# Patient Record
Sex: FEMALE | Race: WHITE | NOT HISPANIC OR LATINO | Employment: UNEMPLOYED | URBAN - METROPOLITAN AREA
[De-identification: names, ages, dates, MRNs, and addresses within clinical notes are randomized per-mention and may not be internally consistent; named-entity substitution may affect disease eponyms.]

---

## 2019-02-20 PROBLEM — N39.3 FEMALE STRESS INCONTINENCE: Status: ACTIVE | Noted: 2019-02-20

## 2020-05-19 ENCOUNTER — CLINICAL SUPPORT (OUTPATIENT)
Dept: URGENT CARE | Facility: CLINIC | Age: 35
End: 2020-05-19
Payer: MEDICAID

## 2020-05-19 VITALS
BODY MASS INDEX: 30.96 KG/M2 | HEIGHT: 66 IN | DIASTOLIC BLOOD PRESSURE: 66 MMHG | WEIGHT: 192.63 LBS | OXYGEN SATURATION: 97 % | HEART RATE: 77 BPM | TEMPERATURE: 98 F | SYSTOLIC BLOOD PRESSURE: 104 MMHG

## 2020-05-19 DIAGNOSIS — S29.012A STRAIN OF THORACIC BACK REGION: Primary | ICD-10-CM

## 2020-05-19 DIAGNOSIS — R07.9 LEFT-SIDED CHEST PAIN: ICD-10-CM

## 2020-05-19 PROCEDURE — 71100 X-RAY EXAM RIBS UNI 2 VIEWS: CPT | Mod: S$GLB,,, | Performed by: EMERGENCY MEDICINE

## 2020-05-19 PROCEDURE — 71100 PR X-RAY RIBS 2 VW UNILAT: ICD-10-PCS | Mod: S$GLB,,, | Performed by: EMERGENCY MEDICINE

## 2020-05-19 PROCEDURE — 99204 OFFICE O/P NEW MOD 45 MIN: CPT | Mod: 25,S$GLB,, | Performed by: NURSE PRACTITIONER

## 2020-05-19 PROCEDURE — 99204 PR OFFICE/OUTPT VISIT, NEW, LEVL IV, 45-59 MIN: ICD-10-PCS | Mod: 25,S$GLB,, | Performed by: NURSE PRACTITIONER

## 2020-05-19 RX ORDER — METHOCARBAMOL 500 MG/1
500 TABLET, FILM COATED ORAL 3 TIMES DAILY
Qty: 30 TABLET | Refills: 0 | Status: SHIPPED | OUTPATIENT
Start: 2020-05-19 | End: 2020-05-29

## 2020-05-19 RX ORDER — DEXAMETHASONE SODIUM PHOSPHATE 4 MG/ML
8 INJECTION, SOLUTION INTRA-ARTICULAR; INTRALESIONAL; INTRAMUSCULAR; INTRAVENOUS; SOFT TISSUE
Status: COMPLETED | OUTPATIENT
Start: 2020-05-19 | End: 2020-05-19

## 2020-05-19 RX ORDER — PREDNISONE 20 MG/1
20 TABLET ORAL 2 TIMES DAILY
Qty: 10 TABLET | Refills: 0 | Status: SHIPPED | OUTPATIENT
Start: 2020-05-19 | End: 2020-05-24

## 2020-05-19 RX ADMIN — DEXAMETHASONE SODIUM PHOSPHATE 8 MG: 4 INJECTION, SOLUTION INTRA-ARTICULAR; INTRALESIONAL; INTRAMUSCULAR; INTRAVENOUS; SOFT TISSUE at 04:05

## 2020-05-19 NOTE — PATIENT INSTRUCTIONS
Noncardiac Chest Pain    Based on your visit today, the healthcare provider doesnt know what is causing your chest pain. In most cases, people who come to the emergency department with chest pain dont have a problem with their heart. Instead, the pain is caused by other conditions. It's important for the healthcare team to be sure you are not having a life threatening cause for chest pain such as a heart attack, blood clot in the lungs, collapsed lung, ruptured esophagus, or tearing of the aorta. Once these major causes have been ruled out, you may have further evaluation for non-heart causes of chest pain. These may be problems with the lungs, muscles, bones, digestive tract, nerves, or mental health.  Lung problems  · Inflammation around the lungs (pleurisy)  · Collapsed lung (pneumothorax)  · Fluid around the lungs (pleural effusion)  · Lung cancer (a rare cause of chest pain)  Muscle or bone problems  · Inflamed cartilage between the ribs (costochondritis)  · Fibromyalgia  · Rheumatoid arthritis  · Chest wall strain  Digestive system problems  · Reflux  · Stomach ulcer  · Spasms of the esophagus  · Gall stones  · Gallbladder inflammation  Mental health conditions  · Panic or anxiety attacks  · Emotional distress  Your condition doesnt seem serious and your pain doesnt appear to be coming from your heart. But sometimes the signs of a serious problem take more time to appear. Watch for the warning signs listed below.  Home care  Follow these guidelines when caring for yourself at home:  · Rest today and avoid strenuous activity.  · Take any prescribed medicine as directed.  Follow-up care  Follow up with your healthcare provider, or as advised, if you dont start to feel better within 24 hours.  When to seek medical advice  Call your healthcare provider right away if any of these occur:  · A change in the type of pain. Call if it feels different, becomes more serious, lasts longer, or begins to spread into  your shoulder, arm, neck, jaw, or back.  · Shortness of breath  · You feel more pain when you breathe  · Cough with dark-colored mucus or blood  · Weakness, dizziness, or fainting  · Fever of 100.4ºF (38ºC) or higher, or as directed by your healthcare provider  · Swelling, pain, or redness in one leg  Date Last Reviewed: 12/1/2016  © 7347-7710 [employer] [address] [place] [address], [place] [address]. All rights reserved. This information is not intended as a substitute for professional medical care. Always follow your healthcare professional's instructions.        Uncertain Causes of Chest Pain    Chest pain can happen for a number of reasons. Sometimes the cause can't be determined. If your condition does not seem serious, and your pain does not appear to be coming from your heart, your healthcare provider may recommend watching it closely. Sometimes the signs of a serious problem take more time to appear. Many problems not related to your heart can cause chest pain.These include:  · Musculoskeletal. Costochondritis, an inflammation of the tissues around the ribs that can occur from trauma or overuse injuries  · Respiratory. Pneumonia, pneumothorax, or pneumonitis (inflammation of the lining of the chest and lungs)  · Gastrointestinal. Esophageal reflux, heartburn, or gallbladder disease  · Anxiety and panic disorders  · Nerve compression and neuritis  · Miscellaneous problems such as aortic aneurysm or pulmonary embolism (a blood clot in the lungs)  Home care  After your visit, follow these recommendations:  · Rest today and avoid strenuous activity.  · Take any prescribed medicine as directed.  · Be aware of any recurrent chest pain and notice any changes  Follow-up care  Follow up with your healthcare provider if you do not start to feel better within 24 hours, or as advised.  Call 911  Call 911 if any of these occur:  · A change in the type of pain: if it feels different, becomes more severe, lasts  longer, or begins to spread into your shoulder, arm, neck, jaw or back  · Shortness of breath or increased pain with breathing  · Weakness, dizziness, or fainting  · Rapid heart beat  · Crushing sensation in your chest  When to seek medical advice  Call your healthcare provider right away if any of the following occur:  · Cough with dark colored sputum (phlegm) or blood  · Fever of 100.4ºF (38ºC) or higher, or as directed by your healthcare provider  · Swelling, pain or redness in one leg  · Shortness of breath  Date Last Reviewed: 12/30/2015  © 7948-6707 [employer]. [address] [place] [address]. All rights reserved. This information is not intended as a substitute for professional medical care. Always follow your healthcare professional's instructions.        Chest Wall Pain: Costochondritis    The chest pain that you have had today is caused by costochondritis. This condition is caused by an inflammation of the cartilage joining your ribs to your breastbone. It is not caused by heart or lung problems. Your healthcare team has made sure that the chest pain you feel is not from a life threatening cause of chest pain such as heart attack, collapsed lung, blood clot in the lung, tear in the aorta, or esophageal rupture. The inflammation may have been brought on by a blow to the chest, lifting heavy objects, intense exercise, or an illness that made you cough and sneeze a lot. It often occurs during times of emotional stress. It can be painful, but it is not dangerous. It usually goes away in 1 to 2 weeks. But it may happen again. Rarely, a more serious condition may cause symptoms similar to costochondritis. Thats why its important to watch for the warning signs listed below.  Home care  Follow these guidelines when caring for yourself at home:  · If you feel that emotional stress is a cause of your condition, try to figure out the sources of that stress. It may not be obvious. Learn ways  to deal with the stress in your life. This can include regular exercise, muscle relaxation, meditation, or simply taking time out for yourself.  · You may use acetaminophen, ibuprofen, or naproxen to control pain, unless another pain medicine was prescribed. If you have liver or kidney disease or ever had a stomach ulcer, talk with your healthcare provider before using these medicines.  · You can also help ease pain by using a hot, wet compress or heating pad. Use this with or without a medicated skin cream that helps relieves pain.  · Do stretching exercise as advised by your provider.  · Take any prescribed medicines as directed.  Follow-up care  Follow up with your healthcare provider, or as advised, if you do not start to get better in the next 2 days.  When to seek medical advice  Call your healthcare provider right away if any of these occur:  · A change in the type of pain. Call if it feels different, becomes more serious, lasts longer, or spreads into your shoulder, arm, neck, jaw, or back.  · Shortness of breath or pain gets worse when you breathe  · Weakness, dizziness, or fainting  · Cough with dark-colored sputum (phlegm) or blood  · Abdominal pain  · Dark red or black stools  · Fever of 100.4ºF (38ºC) or higher, or as directed by your healthcare provider  Date Last Reviewed: 12/1/2016  © 4268-6159 [employer]. [address] [place] [address]. All rights reserved. This information is not intended as a substitute for professional medical care. Always follow your healthcare professional's instructions.        Self-Care for Strains and Sprains  Most minor strains and sprains can be treated with self-care. Recovering from a strain or sprain may take 6 to 8 weeks. Your self-care goal is to reduce pain and immobilize the injury to speed healing.     A sprain injures ligaments (tissue that connects bones to bones).        A strain injures muscles or tendons (tissue that connects muscles  to bones).   Support the injured area  Wrapping the injured area provides support for short, necessary activities. Be careful not to wrap the area too tightly. This could cut off the blood supply.  · Support a wrist, elbow, or shoulder with a sling.  · Wrap an ankle or knee with an elastic bandage.  · Tape a finger or toe to the one next to it.  Use cold and heat  Cold reduces swelling. Both cold and heat reduce pain. Heat should not be used in the initial treatment of the injury. When using cold or heat, always place a towel between the pack and your skin.  · Apply ice or a cold pack 10 to 15 minutes every hour youre awake for the first 2 days.  · After the swelling goes down, use cold or heat to control pain. Dont use heat late in the day, since it can cause swelling when youre not active.  Rest and elevate  Rest and elevation help your injury heal faster.  · Raise the injured area above your heart level.  · Keep the injured area from moving.  · Limit the use of the joint or limb.  Use medicine  · Aspirin reduces pain and swelling. (Note: Dont give aspirin to a child 18 or younger unless prescribed by the doctor.)  · Aspirin substitutes, such as ibuprofen, can reduce pain. Some substitutes reduce swelling, too. Ask your pharmacist which substitutes you can use.  Call your doctor if:  · The injured joint wont move, or bones make a grating sound when they move.  · You cant put weight on the injured area, even after 24 hours.  · The injured body part is cold, blue, or numb.  · The joint or limb appears bent or crooked.  · Pain increases or doesnt improve in 4 days.  · When pressing along the injured area, you notice a spot that is especially painful.   Date Last Reviewed: 9/29/2015  © 2831-6703 [employer]. [address] [place] [address], [place] [address]. All rights reserved. This information is not intended as a substitute for professional medical care. Always follow your healthcare professional's  instructions.

## 2020-05-19 NOTE — PROGRESS NOTES
"Subjective:       Patient ID: Shelia Mccurdy is a 34 y.o. female.    Vitals:  height is 5' 6" (1.676 m) and weight is 87.4 kg (192 lb 9.6 oz). Her oral temperature is 97.7 °F (36.5 °C). Her blood pressure is 104/66 and her pulse is 77. Her oxygen saturation is 97%.     Chief Complaint: Back Pain    Pt states "Pain under the shoulder blades and the same pain is in the breast area x's 2 days; pt also states has a hx of pneumonia and it feels similar to what she felt in the back."    Back Pain   This is a new problem. The current episode started in the past 7 days. The problem occurs constantly. The problem has been gradually worsening since onset. The pain does not radiate. The symptoms are aggravated by bending, position, lying down, sitting and standing. Associated symptoms include chest pain. Pertinent negatives include no abdominal pain or fever. Treatments tried: Acetaminophen. The treatment provided no relief.       Constitution: Negative for chills, fatigue, fever and international travel in last 60 days.   HENT: Negative for trouble swallowing.    Neck: Negative for neck pain.   Cardiovascular: Positive for chest pain. Negative for chest trauma, leg swelling, palpitations and passing out.   Respiratory: Negative for sleep apnea and shortness of breath.    Gastrointestinal: Negative for abdominal pain, nausea, vomiting and heartburn.   Genitourinary: Negative for history of kidney stones.   Musculoskeletal: Positive for back pain.   Skin: Negative for rash.   Neurological: Negative for light-headedness and passing out.   Hematologic/Lymphatic: Negative for history of blood clots.   Psychiatric/Behavioral: Negative for nervous/anxious. The patient is not nervous/anxious.        Objective:      Physical Exam   Musculoskeletal:        Thoracic back: She exhibits decreased range of motion (pain with rOM), tenderness (very tender to palpation), pain and spasm. She exhibits no bony tenderness, no swelling, no " edema and no laceration.        Back:          Assessment:       1. Strain of thoracic back region    2. Left-sided chest pain        Plan:     xray reviewed by me, no acute fracture or dislocation. Old fractures noted to ribs, healed  Will treat as musculoskeletal, instructed to follow up with PCP    Strain of thoracic back region    Left-sided chest pain  -     X-Ray Ribs 2 View Left; Future; Expected date: 05/19/2020    Other orders  -     dexamethasone injection 8 mg  -     predniSONE (DELTASONE) 20 MG tablet; Take 1 tablet (20 mg total) by mouth 2 (two) times daily. for 5 days  Dispense: 10 tablet; Refill: 0  -     methocarbamoL (ROBAXIN) 500 MG Tab; Take 1 tablet (500 mg total) by mouth 3 (three) times daily. for 10 days  Dispense: 30 tablet; Refill: 0

## 2020-05-19 NOTE — LETTER
May 19, 2020      [facility]  [address]  [facility] [address]  Phone: [phone]       Patient: Shelia Mccurdy   YOB: 1985  Date of Visit: 05/19/2020    To Whom It May Concern:    Eriberto Mccurdy  was at [facility] on 05/19/2020. She may return to work/school on 5/21/2020 with no restrictions. If you have any questions or concerns, or if I can be of further assistance, please do not hesitate to contact me.    Sincerely,    EDUARDO Ca

## 2021-02-25 VITALS
TEMPERATURE: 98 F | WEIGHT: 190 LBS | DIASTOLIC BLOOD PRESSURE: 91 MMHG | HEART RATE: 84 BPM | BODY MASS INDEX: 30.53 KG/M2 | SYSTOLIC BLOOD PRESSURE: 168 MMHG | HEIGHT: 66 IN | OXYGEN SATURATION: 98 % | RESPIRATION RATE: 16 BRPM

## 2021-02-25 PROCEDURE — 99281 EMR DPT VST MAYX REQ PHY/QHP: CPT

## 2021-02-26 ENCOUNTER — HOSPITAL ENCOUNTER (EMERGENCY)
Facility: HOSPITAL | Age: 36
Discharge: HOME | End: 2021-02-26
Attending: EMERGENCY MEDICINE
Payer: MEDICAID

## 2021-02-26 DIAGNOSIS — R21 FACIAL RASH: Primary | ICD-10-CM

## 2021-02-26 DIAGNOSIS — F19.10 SUBSTANCE ABUSE: ICD-10-CM

## 2021-10-12 ENCOUNTER — HOSPITAL ENCOUNTER (EMERGENCY)
Facility: HOSPITAL | Age: 36
Discharge: HOME | End: 2021-10-12
Attending: EMERGENCY MEDICINE
Payer: MEDICAID

## 2021-10-12 VITALS
HEART RATE: 88 BPM | DIASTOLIC BLOOD PRESSURE: 85 MMHG | RESPIRATION RATE: 14 BRPM | OXYGEN SATURATION: 98 % | SYSTOLIC BLOOD PRESSURE: 136 MMHG | WEIGHT: 172 LBS | TEMPERATURE: 98 F | BODY MASS INDEX: 27.64 KG/M2 | HEIGHT: 66 IN

## 2021-10-12 DIAGNOSIS — R20.2 HAND PARESTHESIA, UNSPECIFIED LATERALITY: ICD-10-CM

## 2021-10-12 DIAGNOSIS — M25.539 PAIN IN WRIST, UNSPECIFIED LATERALITY: Primary | ICD-10-CM

## 2021-10-12 PROCEDURE — 99282 EMERGENCY DEPT VISIT SF MDM: CPT

## 2022-02-05 ENCOUNTER — HOSPITAL ENCOUNTER (EMERGENCY)
Facility: HOSPITAL | Age: 37
Discharge: HOME | End: 2022-02-05
Attending: EMERGENCY MEDICINE
Payer: MEDICAID

## 2022-02-05 VITALS
TEMPERATURE: 99 F | BODY MASS INDEX: 27.64 KG/M2 | DIASTOLIC BLOOD PRESSURE: 72 MMHG | OXYGEN SATURATION: 100 % | RESPIRATION RATE: 20 BRPM | SYSTOLIC BLOOD PRESSURE: 113 MMHG | HEART RATE: 68 BPM | HEIGHT: 66 IN | WEIGHT: 172 LBS

## 2022-02-05 DIAGNOSIS — N83.209 RUPTURED OVARIAN CYST: ICD-10-CM

## 2022-02-05 DIAGNOSIS — N73.0 PID (ACUTE PELVIC INFLAMMATORY DISEASE): Primary | ICD-10-CM

## 2022-02-05 DIAGNOSIS — N39.0 URINARY TRACT INFECTION WITHOUT HEMATURIA, SITE UNSPECIFIED: ICD-10-CM

## 2022-02-05 DIAGNOSIS — F14.10 COCAINE ABUSE: ICD-10-CM

## 2022-02-05 DIAGNOSIS — R10.9 ABDOMINAL PAIN, UNSPECIFIED ABDOMINAL LOCATION: ICD-10-CM

## 2022-02-05 DIAGNOSIS — A59.9 TRICHOMONAS INFECTION: ICD-10-CM

## 2022-02-05 DIAGNOSIS — D72.829 LEUKOCYTOSIS, UNSPECIFIED TYPE: ICD-10-CM

## 2022-02-05 DIAGNOSIS — N83.8 ENLARGED OVARY: ICD-10-CM

## 2022-02-05 LAB
ALBUMIN SERPL BCP-MCNC: 4 G/DL (ref 3.5–5.2)
ALP SERPL-CCNC: 74 U/L (ref 55–135)
ALT SERPL W/O P-5'-P-CCNC: 14 U/L (ref 10–44)
AMPHET+METHAMPHET UR QL: ABNORMAL
ANION GAP SERPL CALC-SCNC: 9 MMOL/L (ref 8–16)
AST SERPL-CCNC: 14 U/L (ref 10–40)
B-HCG UR QL: NEGATIVE
BACTERIA #/AREA URNS HPF: ABNORMAL /HPF
BARBITURATES UR QL SCN>200 NG/ML: NEGATIVE
BASOPHILS # BLD AUTO: 0.06 K/UL (ref 0–0.2)
BASOPHILS NFR BLD: 0.4 % (ref 0–1.9)
BENZODIAZ UR QL SCN>200 NG/ML: NEGATIVE
BILIRUB SERPL-MCNC: 0.9 MG/DL (ref 0.1–1)
BILIRUB UR QL STRIP: NEGATIVE
BUN SERPL-MCNC: 7 MG/DL (ref 6–20)
BZE UR QL SCN: ABNORMAL
CALCIUM SERPL-MCNC: 9.3 MG/DL (ref 8.7–10.5)
CANNABINOIDS UR QL SCN: ABNORMAL
CHLORIDE SERPL-SCNC: 98 MMOL/L (ref 95–110)
CLARITY UR: ABNORMAL
CO2 SERPL-SCNC: 27 MMOL/L (ref 23–29)
COLOR UR: YELLOW
CREAT SERPL-MCNC: 0.5 MG/DL (ref 0.5–1.4)
CREAT UR-MCNC: 93 MG/DL (ref 15–325)
CTP QC/QA: YES
DIFFERENTIAL METHOD: ABNORMAL
EOSINOPHIL # BLD AUTO: 0.1 K/UL (ref 0–0.5)
EOSINOPHIL NFR BLD: 0.6 % (ref 0–8)
ERYTHROCYTE [DISTWIDTH] IN BLOOD BY AUTOMATED COUNT: 13.8 % (ref 11.5–14.5)
EST. GFR  (AFRICAN AMERICAN): >60 ML/MIN/1.73 M^2
EST. GFR  (NON AFRICAN AMERICAN): >60 ML/MIN/1.73 M^2
GLUCOSE SERPL-MCNC: 100 MG/DL (ref 70–110)
GLUCOSE UR QL STRIP: NEGATIVE
HCT VFR BLD AUTO: 41.6 % (ref 37–48.5)
HGB BLD-MCNC: 13.9 G/DL (ref 12–16)
HGB UR QL STRIP: ABNORMAL
HYALINE CASTS #/AREA URNS LPF: 4 /LPF
IMM GRANULOCYTES # BLD AUTO: 0.07 K/UL (ref 0–0.04)
IMM GRANULOCYTES NFR BLD AUTO: 0.4 % (ref 0–0.5)
KETONES UR QL STRIP: NEGATIVE
LEUKOCYTE ESTERASE UR QL STRIP: ABNORMAL
LIPASE SERPL-CCNC: 22 U/L (ref 4–60)
LYMPHOCYTES # BLD AUTO: 2.4 K/UL (ref 1–4.8)
LYMPHOCYTES NFR BLD: 14.8 % (ref 18–48)
MCH RBC QN AUTO: 30.8 PG (ref 27–31)
MCHC RBC AUTO-ENTMCNC: 33.4 G/DL (ref 32–36)
MCV RBC AUTO: 92 FL (ref 82–98)
MICROSCOPIC COMMENT: ABNORMAL
MONOCYTES # BLD AUTO: 1.3 K/UL (ref 0.3–1)
MONOCYTES NFR BLD: 7.6 % (ref 4–15)
NEUTROPHILS # BLD AUTO: 12.5 K/UL (ref 1.8–7.7)
NEUTROPHILS NFR BLD: 76.2 % (ref 38–73)
NITRITE UR QL STRIP: NEGATIVE
NRBC BLD-RTO: 0 /100 WBC
OPIATES UR QL SCN: NEGATIVE
PCP UR QL SCN>25 NG/ML: NEGATIVE
PH UR STRIP: 7 [PH] (ref 5–8)
PLATELET # BLD AUTO: 335 K/UL (ref 150–450)
PMV BLD AUTO: 8.4 FL (ref 9.2–12.9)
POTASSIUM SERPL-SCNC: 4 MMOL/L (ref 3.5–5.1)
PROT SERPL-MCNC: 7.3 G/DL (ref 6–8.4)
PROT UR QL STRIP: NEGATIVE
RBC # BLD AUTO: 4.51 M/UL (ref 4–5.4)
RBC #/AREA URNS HPF: 4 /HPF (ref 0–4)
SODIUM SERPL-SCNC: 134 MMOL/L (ref 136–145)
SP GR UR STRIP: 1.01 (ref 1–1.03)
SQUAMOUS #/AREA URNS HPF: 10 /HPF
T VAGINALIS GENITAL QL WET PREP: ABNORMAL
TOXICOLOGY INFORMATION: ABNORMAL
URN SPEC COLLECT METH UR: ABNORMAL
UROBILINOGEN UR STRIP-ACNC: NEGATIVE EU/DL
WBC # BLD AUTO: 16.36 K/UL (ref 3.9–12.7)
WBC #/AREA URNS HPF: 13 /HPF (ref 0–5)
YEAST GENITAL QL WET PREP: ABNORMAL

## 2022-02-05 PROCEDURE — 96375 TX/PRO/DX INJ NEW DRUG ADDON: CPT

## 2022-02-05 PROCEDURE — 87210 SMEAR WET MOUNT SALINE/INK: CPT | Performed by: NURSE PRACTITIONER

## 2022-02-05 PROCEDURE — 85025 COMPLETE CBC W/AUTO DIFF WBC: CPT | Performed by: NURSE PRACTITIONER

## 2022-02-05 PROCEDURE — 96361 HYDRATE IV INFUSION ADD-ON: CPT

## 2022-02-05 PROCEDURE — 81001 URINALYSIS AUTO W/SCOPE: CPT | Performed by: NURSE PRACTITIONER

## 2022-02-05 PROCEDURE — 87086 URINE CULTURE/COLONY COUNT: CPT | Performed by: NURSE PRACTITIONER

## 2022-02-05 PROCEDURE — 87491 CHLMYD TRACH DNA AMP PROBE: CPT | Performed by: NURSE PRACTITIONER

## 2022-02-05 PROCEDURE — 25000003 PHARM REV CODE 250: Performed by: NURSE PRACTITIONER

## 2022-02-05 PROCEDURE — 80307 DRUG TEST PRSMV CHEM ANLYZR: CPT | Performed by: NURSE PRACTITIONER

## 2022-02-05 PROCEDURE — 25500020 PHARM REV CODE 255: Performed by: NURSE PRACTITIONER

## 2022-02-05 PROCEDURE — 83690 ASSAY OF LIPASE: CPT | Performed by: NURSE PRACTITIONER

## 2022-02-05 PROCEDURE — 81025 URINE PREGNANCY TEST: CPT | Performed by: NURSE PRACTITIONER

## 2022-02-05 PROCEDURE — 63600175 PHARM REV CODE 636 W HCPCS: Performed by: NURSE PRACTITIONER

## 2022-02-05 PROCEDURE — 99285 EMERGENCY DEPT VISIT HI MDM: CPT | Mod: 25

## 2022-02-05 PROCEDURE — 96376 TX/PRO/DX INJ SAME DRUG ADON: CPT

## 2022-02-05 PROCEDURE — 80053 COMPREHEN METABOLIC PANEL: CPT | Performed by: NURSE PRACTITIONER

## 2022-02-05 PROCEDURE — 96365 THER/PROPH/DIAG IV INF INIT: CPT

## 2022-02-05 PROCEDURE — 63700000 PHARM REV CODE 250 ALT 637 W/O HCPCS: Performed by: NURSE PRACTITIONER

## 2022-02-05 RX ORDER — MORPHINE SULFATE 4 MG/ML
4 INJECTION, SOLUTION INTRAMUSCULAR; INTRAVENOUS
Status: COMPLETED | OUTPATIENT
Start: 2022-02-05 | End: 2022-02-05

## 2022-02-05 RX ORDER — DOXYCYCLINE 100 MG/1
100 CAPSULE ORAL 2 TIMES DAILY
Qty: 20 CAPSULE | Refills: 0 | Status: SHIPPED | OUTPATIENT
Start: 2022-02-05 | End: 2022-02-15

## 2022-02-05 RX ORDER — ONDANSETRON 2 MG/ML
4 INJECTION INTRAMUSCULAR; INTRAVENOUS
Status: COMPLETED | OUTPATIENT
Start: 2022-02-05 | End: 2022-02-05

## 2022-02-05 RX ORDER — CEPHALEXIN 500 MG/1
500 CAPSULE ORAL 4 TIMES DAILY
Qty: 20 CAPSULE | Refills: 0 | Status: SHIPPED | OUTPATIENT
Start: 2022-02-05 | End: 2022-02-10

## 2022-02-05 RX ORDER — AZITHROMYCIN 250 MG/1
1000 TABLET, FILM COATED ORAL
Status: COMPLETED | OUTPATIENT
Start: 2022-02-05 | End: 2022-02-05

## 2022-02-05 RX ORDER — METRONIDAZOLE 500 MG/1
500 TABLET ORAL 3 TIMES DAILY
Qty: 21 TABLET | Refills: 0 | Status: SHIPPED | OUTPATIENT
Start: 2022-02-05 | End: 2022-02-12

## 2022-02-05 RX ADMIN — CEFTRIAXONE 1 G: 1 INJECTION, SOLUTION INTRAVENOUS at 05:02

## 2022-02-05 RX ADMIN — MORPHINE SULFATE 4 MG: 4 INJECTION, SOLUTION INTRAMUSCULAR; INTRAVENOUS at 04:02

## 2022-02-05 RX ADMIN — AZITHROMYCIN MONOHYDRATE 1000 MG: 250 TABLET ORAL at 09:02

## 2022-02-05 RX ADMIN — ONDANSETRON 4 MG: 2 INJECTION INTRAMUSCULAR; INTRAVENOUS at 04:02

## 2022-02-05 RX ADMIN — SODIUM CHLORIDE 1000 ML: 0.9 INJECTION, SOLUTION INTRAVENOUS at 04:02

## 2022-02-05 RX ADMIN — IOHEXOL 100 ML: 350 INJECTION, SOLUTION INTRAVENOUS at 05:02

## 2022-02-05 RX ADMIN — MORPHINE SULFATE 4 MG: 4 INJECTION, SOLUTION INTRAMUSCULAR; INTRAVENOUS at 05:02

## 2022-02-05 NOTE — ED NOTES
Pt c/o LLQ pain x1 day, rating the pain 9/10. LBM last night. No urinary symptoms though she noticed some spotting today; not due for period for another week. Pt states she last smoked meth last night. Pt's vital signs are stable, airway protected, is in no distress, and is AOx4.

## 2022-02-05 NOTE — ED PROVIDER NOTES
Encounter Date: 2/5/2022       History     Chief Complaint   Patient presents with    Abdominal Pain     LLQ abdominal pain since yesterday, denies n/v/d      36-year-old female with a history of hepatitis-C, leukemia and childhood currently in remission, also with a history of an appendectomy, cholecystectomy and a tubal ligation, presents to the ER with new onset left lower quadrant abdominal pain described as constant abdominal pain with intermittent exacerbations described as sharp.  Also reports some associated dysuria, also suprapubic abdominal discomfort with urination and noticing some light pink hematuria since onset of a abdominal pain symptoms yesterday.  No nausea vomiting or diarrhea.  No fever.  She reports no flank pain.  She states she sees her OBGYN, Dr. Tightening as for intermittent lower abdominal pains and was told she may need a hysterectomy in the near future if her pain persist.  She states she usually has bad menstrual related cramps but currently is not on her menstrual cycle so she does not feel like this is an menstrual related cramps but overall similar in nature when she previously has menstrual cramps.  Her last menstrual cycle was on January 15th.  She denies any vaginal bleeding or discharge.        Review of patient's allergies indicates:   Allergen Reactions    Aspirin Itching    Toradol [ketorolac] Hallucinations     Past Medical History:   Diagnosis Date    Hepatitis C     Leukemia in remission     Liver disease     STD (female)      Past Surgical History:   Procedure Laterality Date    APPENDECTOMY      CHOLECYSTECTOMY      CYSTOSCOPY N/A 2/20/2019    Procedure: CYSTOSCOPY;  Surgeon: Earle Thomas MD;  Location: [facility] OR;  Service: Urology;  Laterality: N/A;    INSERTION OF MIDURETHRAL SLING N/A 2/20/2019    Procedure: SLING, MIDURETHRAL;  Surgeon: Earle Thomas MD;  Location: [facility] OR;  Service: Urology;  Laterality: N/A;    left elbow      TONSILLECTOMY    "   TUBAL LIGATION       No family history on file.  Social History     Tobacco Use    Smoking status: Current Every Day Smoker     Packs/day: 1.00     Years: 15.00     Pack years: 15.00     Start date: 9/1/2000    Smokeless tobacco: Never Used   Substance Use Topics    Alcohol use: No    Drug use: Yes     Types: IV, "Crack" cocaine, Cocaine, Marijuana     Comment: marijuana 1 week ago, cocaine- 1 month ago     Review of Systems   Constitutional: Negative for chills, fatigue and fever.   HENT: Negative for congestion, postnasal drip, rhinorrhea, sinus pressure, sinus pain, sneezing, sore throat and trouble swallowing.    Eyes: Negative for photophobia and visual disturbance.   Respiratory: Negative for cough, chest tightness, wheezing and stridor.    Cardiovascular: Negative for chest pain, palpitations and leg swelling.   Gastrointestinal: Positive for abdominal pain. Negative for abdominal distention, blood in stool, constipation, diarrhea, nausea and vomiting.   Endocrine: Negative for polydipsia, polyphagia and polyuria.   Genitourinary: Positive for dysuria and urgency. Negative for decreased urine volume, difficulty urinating, flank pain, frequency, hematuria, menstrual problem, pelvic pain, vaginal bleeding, vaginal discharge and vaginal pain.   Musculoskeletal: Negative for arthralgias, back pain, gait problem, myalgias, neck pain and neck stiffness.   Skin: Negative for color change, rash and wound.   Allergic/Immunologic: Negative for immunocompromised state.   Neurological: Negative for dizziness, seizures, syncope, speech difficulty, weakness, light-headedness, numbness and headaches.   Hematological: Does not bruise/bleed easily.   Psychiatric/Behavioral: Negative for agitation and confusion.   All other systems reviewed and are negative.      Physical Exam     Initial Vitals [02/05/22 1534]   BP Pulse Resp Temp SpO2   119/80 89 20 98.1 °F (36.7 °C) 100 %      MAP       --         Physical " Exam    Nursing note and vitals reviewed.  Constitutional: She appears well-developed and well-nourished. She is not diaphoretic. No distress.   HENT:   Head: Normocephalic and atraumatic.   Right Ear: External ear normal.   Left Ear: External ear normal.   Nose: Nose normal.   Mouth/Throat: Oropharynx is clear and moist. No oropharyngeal exudate.   Eyes: Conjunctivae are normal. Pupils are equal, round, and reactive to light.   Neck: Neck supple.   Normal range of motion.  Cardiovascular: Normal rate.   No murmur heard.  Pulmonary/Chest: Breath sounds normal. She has no wheezes. She has no rhonchi. She has no rales.   Abdominal: Abdomen is soft and flat. Bowel sounds are normal. There is abdominal tenderness in the suprapubic area and left lower quadrant.   No right CVA tenderness.  No left CVA tenderness. There is rebound. There is no guarding.   Musculoskeletal:         General: No tenderness or edema. Normal range of motion.      Cervical back: Normal range of motion and neck supple.     Neurological: She is alert and oriented to person, place, and time. She has normal strength. GCS score is 15. GCS eye subscore is 4. GCS verbal subscore is 5. GCS motor subscore is 6.   Skin: Skin is warm and dry. Capillary refill takes less than 2 seconds. No rash noted. No erythema.   Psychiatric: Her mood appears anxious.         ED Course   Procedures  Labs Reviewed   CBC W/ AUTO DIFFERENTIAL - Abnormal; Notable for the following components:       Result Value    WBC 16.36 (*)     MPV 8.4 (*)     Gran # (ANC) 12.5 (*)     Immature Grans (Abs) 0.07 (*)     Mono # 1.3 (*)     Gran % 76.2 (*)     Lymph % 14.8 (*)     All other components within normal limits   COMPREHENSIVE METABOLIC PANEL - Abnormal; Notable for the following components:    Sodium 134 (*)     All other components within normal limits   URINALYSIS, REFLEX TO URINE CULTURE - Abnormal; Notable for the following components:    Appearance, UA Hazy (*)     Occult  Blood UA 1+ (*)     Leukocytes, UA 2+ (*)     All other components within normal limits    Narrative:     Specimen Source->Urine   VAGINAL SCREEN - Abnormal; Notable for the following components:    Trichomonas Rare (*)     All other components within normal limits    Narrative:     Release to patient->Immediate   URINALYSIS MICROSCOPIC - Abnormal; Notable for the following components:    WBC, UA 13 (*)     Hyaline Casts, UA 4 (*)     All other components within normal limits    Narrative:     Specimen Source->Urine   DRUG SCREEN PANEL, URINE EMERGENCY - Abnormal; Notable for the following components:    Cocaine (Metab.) Presumptive Positive (*)     Amphetamine Screen, Ur Presumptive Positive (*)     THC Presumptive Positive (*)     All other components within normal limits   C. TRACHOMATIS/N. GONORRHOEAE BY AMP DNA   CULTURE, URINE   LIPASE   DRUG SCREEN PANEL, URINE EMERGENCY   POCT URINE PREGNANCY     Results for orders placed or performed during the hospital encounter of 02/05/22   CBC auto differential   Result Value Ref Range    WBC 16.36 (H) 3.90 - 12.70 K/uL    RBC 4.51 4.00 - 5.40 M/uL    Hemoglobin 13.9 12.0 - 16.0 g/dL    Hematocrit 41.6 37.0 - 48.5 %    MCV 92 82 - 98 fL    MCH 30.8 27.0 - 31.0 pg    MCHC 33.4 32.0 - 36.0 g/dL    RDW 13.8 11.5 - 14.5 %    Platelets 335 150 - 450 K/uL    MPV 8.4 (L) 9.2 - 12.9 fL    Immature Granulocytes 0.4 0.0 - 0.5 %    Gran # (ANC) 12.5 (H) 1.8 - 7.7 K/uL    Immature Grans (Abs) 0.07 (H) 0.00 - 0.04 K/uL    Lymph # 2.4 1.0 - 4.8 K/uL    Mono # 1.3 (H) 0.3 - 1.0 K/uL    Eos # 0.1 0.0 - 0.5 K/uL    Baso # 0.06 0.00 - 0.20 K/uL    nRBC 0 0 /100 WBC    Gran % 76.2 (H) 38.0 - 73.0 %    Lymph % 14.8 (L) 18.0 - 48.0 %    Mono % 7.6 4.0 - 15.0 %    Eosinophil % 0.6 0.0 - 8.0 %    Basophil % 0.4 0.0 - 1.9 %    Differential Method Automated    Comprehensive metabolic panel   Result Value Ref Range    Sodium 134 (L) 136 - 145 mmol/L    Potassium 4.0 3.5 - 5.1 mmol/L    Chloride  98 95 - 110 mmol/L    CO2 27 23 - 29 mmol/L    Glucose 100 70 - 110 mg/dL    BUN 7 6 - 20 mg/dL    Creatinine 0.5 0.5 - 1.4 mg/dL    Calcium 9.3 8.7 - 10.5 mg/dL    Total Protein 7.3 6.0 - 8.4 g/dL    Albumin 4.0 3.5 - 5.2 g/dL    Total Bilirubin 0.9 0.1 - 1.0 mg/dL    Alkaline Phosphatase 74 55 - 135 U/L    AST 14 10 - 40 U/L    ALT 14 10 - 44 U/L    Anion Gap 9 8 - 16 mmol/L    eGFR if African American >60.0 >60 mL/min/1.73 m^2    eGFR if non African American >60.0 >60 mL/min/1.73 m^2   Lipase   Result Value Ref Range    Lipase 22 4 - 60 U/L   Urinalysis, Reflex to Urine Culture Urine, Clean Catch    Specimen: Urine   Result Value Ref Range    Specimen UA Urine, Clean Catch     Color, UA Yellow Yellow, Straw, Kellen    Appearance, UA Hazy (A) Clear    pH, UA 7.0 5.0 - 8.0    Specific Gravity, UA 1.015 1.005 - 1.030    Protein, UA Negative Negative    Glucose, UA Negative Negative    Ketones, UA Negative Negative    Bilirubin (UA) Negative Negative    Occult Blood UA 1+ (A) Negative    Nitrite, UA Negative Negative    Urobilinogen, UA Negative Negative EU/dL    Leukocytes, UA 2+ (A) Negative   Vaginal Screen Vagina   Result Value Ref Range    Trichomonas Rare (A) None    Budding Yeast None None   Urinalysis Microscopic   Result Value Ref Range    RBC, UA 4 0 - 4 /hpf    WBC, UA 13 (H) 0 - 5 /hpf    Bacteria Rare None-Occ /hpf    Squam Epithel, UA 10 /hpf    Hyaline Casts, UA 4 (A) 0-1/lpf /lpf    Microscopic Comment SEE COMMENT    Drug screen panel, in-house   Result Value Ref Range    Benzodiazepines Negative Negative    Cocaine (Metab.) Presumptive Positive (A) Negative    Opiate Scrn, Ur Negative Negative    Barbiturate Screen, Ur Negative Negative    Amphetamine Screen, Ur Presumptive Positive (A) Negative    THC Presumptive Positive (A) Negative    Phencyclidine Negative Negative    Creatinine, Urine 93.0 15.0 - 325.0 mg/dL    Toxicology Information SEE COMMENT    POCT urine pregnancy   Result Value Ref Range     POC Preg Test, Ur Negative Negative     Acceptable Yes      Imaging Results          US Pelvis Complete Non OB (Final result)  Result time 02/05/22 20:13:27   Procedure changed from US Pelvis Comp with Transvag NON-OB (xpd     Final result by Nelson Bellamy DO (02/05/22 20:13:27)                 Narrative:    EXAM:  US PELVIS TRANSABDOMINAL, COMPLETE    CLINICAL INDICATION:  36 years old Female; LLQ abdominal pain, CT follow up.    TECHNIQUE:  Real-time complete transabdominal pelvic ultrasound with image documentation.    COMPARISON: CT 2/5/2022.    FINDINGS:    UTERUS/CERVIX:  10.4 x 5.3 x 6.8 cm uterus.  9.7 mm endometrial stripe thickness.  No discrete myometrial mass.    RIGHT OVARY:  2.8 x 1.4 x 2.5 cm right ovary, 5.1 mL volume.  Normal blood flow.    LEFT OVARY:  4.9 x 3.2 x 5.2 cm left ovary, 41.9 mL volume. 2.6 x 2.6 x 3.1 cm follicular cyst. 1.7 cm follicle.  Normal blood flow.    FREE FLUID:  No significant free fluid.    BLADDER:  Unremarkable as visualized.  Wall is normal thickness for degree of distention.    IMPRESSION:  Enlarged left ovary with a 3.1 cm follicular cyst.    Electronically signed by:  Nelson Bellamy DO  2/5/2022 8:13 PM CST Workstation: [id number]                             CT Abdomen Pelvis With Contrast (Final result)  Result time 02/05/22 17:40:22    Final result by Sarita Cuadra MD (02/05/22 17:40:22)                 Narrative:    All CT scans at this facility used dose modulation, iterative reconstruction and/or weight-based dosing when appropriate to reduce radiation doses  as low as reasonably achievable.    HISTORY: LLQ abdominal pain    FINDINGS: Axial postcontrast imaging was performed with 100 mL Omnipaque 350 IV contrast .    CT ABDOMEN: The lung bases are clear. There is no pericardial effusion.    The liver, spleen and pancreas demonstrate normal enhancement. There is no biliary duct dilatation. The gallbladder is absent.    The adrenal  glands are normal.    The kidneys enhance symmetrically without hydronephrosis. There are multiple 3 mm nonobstructing left renal stones.    There are no thick-walled or dilated bowel loops. There is no mesenteric or retroperitoneal adenopathy. The aorta is normal in caliber.    CT PELVIS:    The left ovary is enlarged with fluid around the left ovary. There is a 3.2 cm left ovarian cyst. The uterus and right ovary are normal. There is minimal free fluid in the cul-de-sac. There are no thick-walled or dilated bowel loops. There are no acute osseous abnormalities.    IMPRESSION: Enlargement of the left ovary with 3.2 cm left ovarian cyst and minimal fluid around the left ovary and cul-de-sac which may be secondary ruptured cyst. Correlation with pelvic ultrasound is recommended    No evidence of diverticulitis    Prior cholecystectomy    Electronically signed by:  Sarita Cuadra MD  2/5/2022 5:40 PM CST Workstation: [id number]                                   Imaging Results          US Pelvis Complete Non OB (Final result)  Result time 02/05/22 20:13:27   Procedure changed from US Pelvis Comp with Transvag NON-OB (xpd     Final result by Nelson Bellamy DO (02/05/22 20:13:27)                 Narrative:    EXAM:  US PELVIS TRANSABDOMINAL, COMPLETE    CLINICAL INDICATION:  36 years old Female; LLQ abdominal pain, CT follow up.    TECHNIQUE:  Real-time complete transabdominal pelvic ultrasound with image documentation.    COMPARISON: CT 2/5/2022.    FINDINGS:    UTERUS/CERVIX:  10.4 x 5.3 x 6.8 cm uterus.  9.7 mm endometrial stripe thickness.  No discrete myometrial mass.    RIGHT OVARY:  2.8 x 1.4 x 2.5 cm right ovary, 5.1 mL volume.  Normal blood flow.    LEFT OVARY:  4.9 x 3.2 x 5.2 cm left ovary, 41.9 mL volume. 2.6 x 2.6 x 3.1 cm follicular cyst. 1.7 cm follicle.  Normal blood flow.    FREE FLUID:  No significant free fluid.    BLADDER:  Unremarkable as visualized.  Wall is normal thickness for degree of  distention.    IMPRESSION:  Enlarged left ovary with a 3.1 cm follicular cyst.    Electronically signed by:  Nelson Bellamy DO  2/5/2022 8:13 PM CST Workstation: [id number]                             CT Abdomen Pelvis With Contrast (Final result)  Result time 02/05/22 17:40:22    Final result by Sarita Cuadra MD (02/05/22 17:40:22)                 Narrative:    All CT scans at this facility used dose modulation, iterative reconstruction and/or weight-based dosing when appropriate to reduce radiation doses  as low as reasonably achievable.    HISTORY: LLQ abdominal pain    FINDINGS: Axial postcontrast imaging was performed with 100 mL Omnipaque 350 IV contrast .    CT ABDOMEN: The lung bases are clear. There is no pericardial effusion.    The liver, spleen and pancreas demonstrate normal enhancement. There is no biliary duct dilatation. The gallbladder is absent.    The adrenal glands are normal.    The kidneys enhance symmetrically without hydronephrosis. There are multiple 3 mm nonobstructing left renal stones.    There are no thick-walled or dilated bowel loops. There is no mesenteric or retroperitoneal adenopathy. The aorta is normal in caliber.    CT PELVIS:    The left ovary is enlarged with fluid around the left ovary. There is a 3.2 cm left ovarian cyst. The uterus and right ovary are normal. There is minimal free fluid in the cul-de-sac. There are no thick-walled or dilated bowel loops. There are no acute osseous abnormalities.    IMPRESSION: Enlargement of the left ovary with 3.2 cm left ovarian cyst and minimal fluid around the left ovary and cul-de-sac which may be secondary ruptured cyst. Correlation with pelvic ultrasound is recommended    No evidence of diverticulitis    Prior cholecystectomy    Electronically signed by:  Sarita Cuadra MD  2/5/2022 5:40 PM CST Workstation: [id number]                               Medications   sodium chloride 0.9% bolus 1,000 mL (0 mLs Intravenous Stopped  22 1726)   ondansetron injection 4 mg (4 mg Intravenous Given 22 1628)   morphine injection 4 mg (4 mg Intravenous Given 22 1628)   cefTRIAXone (ROCEPHIN) 1 g/50 mL D5W IVPB (0 g Intravenous Stopped 22 175)   iohexoL (OMNIPAQUE 350) injection 100 mL (100 mLs Intravenous Given 22 1703)   morphine injection 4 mg (4 mg Intravenous Given 22 172)   azithromycin tablet 1,000 mg (1,000 mg Oral Given 22)     Medical Decision Makin-year-old female presents to the ER with left lower quadrant abdominal pain that began yesterday and has been gradually worsening.  No nausea vomiting diarrhea symptoms.  She denies any vaginal discharge or vaginal bleeding.  No fever.  On exam today she had reproducible pain in her left lower quadrant.  Initial labs obtained revealed an elevated [place] [address]. Normal H&H.  Her lipase is not elevated.  Her UPT is negative.  UA is notable for 2+ leukocytes negative nitrites 1+ occult blood, 13 white blood cells 4 red blood cells rare bacteria.  UDS was obtained and was positive for cocaine amphetamines and THC.  We initially evaluated with CT abdomen pelvis with IV contrast.  CT is notable for enlargement of the left ovary with a 3.2 cm left ovarian cyst and minimal fluid around the left ovary in cul-de-sac which may be secondary to ruptured cyst.  No evidence diverticulitis.  Prior cholecystectomy noted.  We further evaluated with pelvic and transvaginal ultrasound.  Ultrasound is notable for an enlarged left ovary with a 3.1 cm follicular cyst.  Normal blood flow.  A pelvic exam was completed in the ER.  She does have some light yellow mixed with brown blood discharge.  She does not have any significant adnexal tenderness or fullness mild cervical motion tenderness so we will treat this as possible PID.  A wet prep was collected along with a GC culture and sent off.  Wet prep is notable for rare Trichomonas.  While in the ER patient  received 1 g Rocephin, a g of Zithromax p.o..  We will discharge home with Keflex to treat for UTI, Flagyl to treat for Trichomonas, and doxycycline to treat for PID.  She is afebrile she received pain medicine in the ER and no longer having any abdominal tenderness and appears in no distress.  All labs and plan discussed with patient and she understands she needs to follow up closely with her OBGYN on Monday for close re-evaluation.  ER return precautions discussed in detail and she understands she should return for any new worsening symptoms.             ED Course as of 02/05/22 2143   Sat Feb 05, 2022   1653 WBC(!): 16.36 [AS]   1654 WBC, UA(!): 13 [AS]   1654 Leukocytes, UA(!): 2+ [AS]   1654 Occult Blood UA(!): 1+ [AS]   1708 Cocaine (Metab.)(!): Presumptive Positive [AS]   1708 Amphetamine Screen, Ur(!): Presumptive Positive [AS]   1708 Marijuana (THC) Metabolite(!): Presumptive Positive [AS]      ED Course User Index  [AS] Roma Brunson NP             Clinical Impression:   Final diagnoses:  [N73.0] PID (acute pelvic inflammatory disease) (Primary)  [A59.9] Trichomonas infection  [N83.209] Ruptured ovarian cyst  [N39.0] Urinary tract infection without hematuria, site unspecified  [R10.9] Abdominal pain, unspecified abdominal location  [D72.829] Leukocytosis, unspecified type  [N83.8] Enlarged ovary  [F14.10] Cocaine abuse          ED Disposition Condition    Discharge Stable        ED Prescriptions     Medication Sig Dispense Start Date End Date Auth. Provider    metroNIDAZOLE (FLAGYL) 500 MG tablet Take 1 tablet (500 mg total) by mouth 3 (three) times daily. for 7 days 21 tablet 2/5/2022 2/12/2022 Roma Brunson NP    doxycycline (VIBRAMYCIN) 100 MG Cap Take 1 capsule (100 mg total) by mouth 2 (two) times daily. for 10 days 20 capsule 2/5/2022 2/15/2022 Roma Brunson NP    cephALEXin (KEFLEX) 500 MG capsule Take 1 capsule (500 mg total) by mouth 4 (four) times daily. for 5 days 20 capsule  2/5/2022 2/10/2022 Roma Brunson NP        Follow-up Information     Follow up With Specialties Details Why Contact Info Additional Information    Rubens Barron MD Obstetrics and Gynecology Schedule an appointment as soon as possible for a visit on 2/7/2022 for ER visit follow up and re-evaluation [address] [facility] [address]  [phone]       [facility] - Emergency Dept Emergency Medicine Go to  As needed, If symptoms worsen [address] [facility] [address]  [phone] 1st floor           Roma Brunson NP  02/05/22 2141

## 2022-02-05 NOTE — Clinical Note
"Shelia Lovell (Jennifer)ler was seen and treated in our emergency department on 2/5/2022.  She may return to work on 02/09/2022.       If you have any questions or concerns, please don't hesitate to call.      Roma Brunson NP"

## 2022-02-07 LAB
BACTERIA UR CULT: NORMAL
BACTERIA UR CULT: NORMAL

## 2022-02-08 LAB
CHLAMYDIA, AMPLIFIED DNA: NEGATIVE
N GONORRHOEAE, AMPLIFIED DNA: NEGATIVE

## 2022-02-10 ENCOUNTER — PATIENT OUTREACH (OUTPATIENT)
Dept: EMERGENCY MEDICINE | Facility: HOSPITAL | Age: 37
End: 2022-02-10
Payer: MEDICAID

## 2022-02-10 NOTE — PROGRESS NOTES
ED navigator called patient at home and offered resources. Patient denied the need for resources and the assessment.

## 2024-05-26 ENCOUNTER — HOSPITAL ENCOUNTER (EMERGENCY)
Facility: HOSPITAL | Age: 39
Discharge: HOME | End: 2024-05-27
Attending: EMERGENCY MEDICINE

## 2024-05-26 DIAGNOSIS — R52 PAIN: ICD-10-CM

## 2024-05-26 DIAGNOSIS — M25.551 PAIN OF RIGHT HIP: Primary | ICD-10-CM

## 2024-05-26 PROCEDURE — 99283 EMERGENCY DEPT VISIT LOW MDM: CPT | Mod: 25

## 2024-05-27 VITALS
OXYGEN SATURATION: 100 % | RESPIRATION RATE: 16 BRPM | DIASTOLIC BLOOD PRESSURE: 77 MMHG | SYSTOLIC BLOOD PRESSURE: 127 MMHG | TEMPERATURE: 98 F | WEIGHT: 200 LBS | BODY MASS INDEX: 32.28 KG/M2 | HEART RATE: 79 BPM

## 2024-05-27 RX ORDER — HYDROCODONE BITARTRATE AND ACETAMINOPHEN 5; 325 MG/1; MG/1
1 TABLET ORAL EVERY 6 HOURS PRN
Qty: 12 TABLET | Refills: 0 | Status: SHIPPED | OUTPATIENT
Start: 2024-05-27 | End: 2024-05-30

## 2024-05-27 NOTE — ED NOTES
Patient is discharged. D/c instructions reviewed. Pt requests to sleep a little longer as she is homeless, informed her she could for a little while unless I needed bed. Pt verbalized understanding.
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
